# Patient Record
Sex: FEMALE | Race: WHITE | NOT HISPANIC OR LATINO | Employment: OTHER | URBAN - METROPOLITAN AREA
[De-identification: names, ages, dates, MRNs, and addresses within clinical notes are randomized per-mention and may not be internally consistent; named-entity substitution may affect disease eponyms.]

---

## 2017-10-25 ENCOUNTER — ESTABLISHED COMPREHENSIVE EXAM (OUTPATIENT)
Dept: URBAN - METROPOLITAN AREA CLINIC 43 | Facility: CLINIC | Age: 77
End: 2017-10-25

## 2017-10-25 DIAGNOSIS — H40.013: ICD-10-CM

## 2017-10-25 DIAGNOSIS — H04.123: ICD-10-CM

## 2017-10-25 DIAGNOSIS — Z96.1: ICD-10-CM

## 2017-10-25 DIAGNOSIS — H43.813: ICD-10-CM

## 2017-10-25 PROCEDURE — 92015 DETERMINE REFRACTIVE STATE: CPT

## 2017-10-25 PROCEDURE — 92014 COMPRE OPH EXAM EST PT 1/>: CPT

## 2017-10-25 PROCEDURE — G8428 CUR MEDS NOT DOCUMENT: HCPCS

## 2017-10-25 PROCEDURE — G8785 BP SCRN NO PERF AT INTERVAL: HCPCS

## 2017-10-25 PROCEDURE — 1036F TOBACCO NON-USER: CPT

## 2017-10-25 ASSESSMENT — TONOMETRY
OS_IOP_MMHG: 14
OD_IOP_MMHG: 14

## 2017-10-25 ASSESSMENT — VISUAL ACUITY
OS_SC: J1
OD_SC: J2
OD_SC: 20/60+2
OS_SC: 20/30-2

## 2018-03-02 NOTE — PATIENT DISCUSSION
MODERATE DRY EYES: CONTINUE DISAPPEARING PRESERVATIVE OR PRESERVATIVE FREE ARTIFICIAL TEARS BID ? QID4-6X A DAY, OU AND THE DAILY INTAKE OF OMEGA-3 DHA/EPA FATTY ACIDS TO HELP RELIEVE SYMPTOMS. ADD NIGHTLY LUBRICATING OINTMENT OR GEL. WILL CONSIDER RESTASIS OR PUNCTAL PLUGS AND/OR LIPIFLOW TREATMENT NEXT VISIT IF NOT RESPONSIVE OR IF SYMPTOMS PERSIST. RETURN FOR FOLLOW-UP AS SCHEDULED OR SOONER IF SYMPTOMS WORSEN.

## 2019-09-30 NOTE — PATIENT DISCUSSION
MODERATE DRY EYES: CONTINUE DISAPPEARING PRESERVATIVE OR PRESERVATIVE FREE ARTIFICIAL TEARS 4-6X A DAY, OU AND THE DAILY INTAKE OF OMEGA-3 DHA/EPA FATTY ACIDS TO HELP RELIEVE SYMPTOMS. ADD NIGHTLY LUBRICATING OINTMENT OR GEL.

## 2021-10-08 NOTE — PATIENT DISCUSSION
Macular degeneration is a degenerative retinal disease that can progress over time and cause a decrease in vision and trouble with low contrast. Sometimes it can lead to leaking of fluid behind the eye which Is called “wet” macular degeneration. AREDS 2 vitamin mixture has been shown to minimize the risk of disease progression. An Amsler grid can be used at home to monitor vision changes. The patient was advised to call the office if new symptoms of persistent blurring or distortion of vision arise as evaluation and possible treatment is necessary to preserve as much vision as possible.